# Patient Record
Sex: MALE | Race: BLACK OR AFRICAN AMERICAN | NOT HISPANIC OR LATINO | Employment: OTHER | URBAN - METROPOLITAN AREA
[De-identification: names, ages, dates, MRNs, and addresses within clinical notes are randomized per-mention and may not be internally consistent; named-entity substitution may affect disease eponyms.]

---

## 2017-01-05 ENCOUNTER — HISTORICAL (OUTPATIENT)
Dept: SURGERY | Facility: HOSPITAL | Age: 77
End: 2017-01-05

## 2017-04-25 ENCOUNTER — HISTORICAL (OUTPATIENT)
Dept: ADMINISTRATIVE | Facility: HOSPITAL | Age: 77
End: 2017-04-25

## 2017-07-05 ENCOUNTER — HISTORICAL (OUTPATIENT)
Dept: LAB | Facility: HOSPITAL | Age: 77
End: 2017-07-05

## 2017-07-05 LAB
ABS NEUT (OLG): 5.84 X10(3)/MCL (ref 2.1–9.2)
ALBUMIN SERPL-MCNC: 3.6 GM/DL (ref 3.4–5)
ALBUMIN/GLOB SERPL: 0.9 RATIO (ref 1.1–2)
ALP SERPL-CCNC: 99 UNIT/L (ref 50–136)
ALT SERPL-CCNC: 16 UNIT/L (ref 12–78)
APTT PPP: 36.4 SECOND(S) (ref 20.6–36)
AST SERPL-CCNC: 10 UNIT/L (ref 15–37)
BASOPHILS # BLD AUTO: 0 X10(3)/MCL (ref 0–0.2)
BASOPHILS NFR BLD AUTO: 1 %
BILIRUB SERPL-MCNC: 0.3 MG/DL (ref 0.2–1)
BILIRUBIN DIRECT+TOT PNL SERPL-MCNC: 0.1 MG/DL (ref 0–0.5)
BILIRUBIN DIRECT+TOT PNL SERPL-MCNC: 0.2 MG/DL (ref 0–0.8)
BUN SERPL-MCNC: 16 MG/DL (ref 7–18)
CALCIUM SERPL-MCNC: 8.3 MG/DL (ref 8.5–10.1)
CHLORIDE SERPL-SCNC: 97 MMOL/L (ref 98–107)
CO2 SERPL-SCNC: 32 MMOL/L (ref 21–32)
CREAT SERPL-MCNC: 5.36 MG/DL (ref 0.7–1.3)
EOSINOPHIL # BLD AUTO: 0.3 X10(3)/MCL (ref 0–0.9)
EOSINOPHIL NFR BLD AUTO: 4 %
ERYTHROCYTE [DISTWIDTH] IN BLOOD BY AUTOMATED COUNT: 15.4 % (ref 11.5–17)
GLOBULIN SER-MCNC: 3.8 GM/DL (ref 2.4–3.5)
GLUCOSE SERPL-MCNC: 142 MG/DL (ref 74–106)
HCT VFR BLD AUTO: 33 % (ref 42–52)
HGB BLD-MCNC: 10 GM/DL (ref 14–18)
INR PPP: 1.05 (ref 0–1.27)
LYMPHOCYTES # BLD AUTO: 1 X10(3)/MCL (ref 0.6–4.6)
LYMPHOCYTES NFR BLD AUTO: 13 %
MCH RBC QN AUTO: 27.9 PG (ref 27–31)
MCHC RBC AUTO-ENTMCNC: 30.3 GM/DL (ref 33–36)
MCV RBC AUTO: 92.2 FL (ref 80–94)
MONOCYTES # BLD AUTO: 0.9 X10(3)/MCL (ref 0.1–1.3)
MONOCYTES NFR BLD AUTO: 10 %
MRSA SCREEN BY PCR: NEGATIVE
NEUTROPHILS # BLD AUTO: 5.84 X10(3)/MCL (ref 1.4–7.9)
NEUTROPHILS NFR BLD AUTO: 71 %
PLATELET # BLD AUTO: 234 X10(3)/MCL (ref 130–400)
PMV BLD AUTO: 9.5 FL (ref 9.4–12.4)
POTASSIUM SERPL-SCNC: 3.4 MMOL/L (ref 3.5–5.1)
PROT SERPL-MCNC: 7.4 GM/DL (ref 6.4–8.2)
PROTHROMBIN TIME: 13.5 SECOND(S) (ref 12.1–14.2)
RBC # BLD AUTO: 3.58 X10(6)/MCL (ref 4.7–6.1)
SODIUM SERPL-SCNC: 141 MMOL/L (ref 136–145)
WBC # SPEC AUTO: 8.2 X10(3)/MCL (ref 4.5–11.5)

## 2017-10-13 ENCOUNTER — HISTORICAL (OUTPATIENT)
Dept: ADMINISTRATIVE | Facility: HOSPITAL | Age: 77
End: 2017-10-13

## 2017-10-30 ENCOUNTER — HISTORICAL (OUTPATIENT)
Dept: RADIOLOGY | Facility: HOSPITAL | Age: 77
End: 2017-10-30

## 2017-10-30 LAB
APTT PPP: 41.4 SECOND(S) (ref 24.8–36.9)
INR PPP: 1.68 (ref 0–1.27)
PLATELET # BLD AUTO: 206 X10(3)/MCL (ref 130–400)
PROTHROMBIN TIME: 19.5 SECOND(S) (ref 12.2–14.7)

## 2017-11-15 ENCOUNTER — HISTORICAL (OUTPATIENT)
Dept: ADMINISTRATIVE | Facility: HOSPITAL | Age: 77
End: 2017-11-15

## 2017-11-15 LAB — TSH SERPL-ACNC: 2.1 MIU/ML (ref 0.36–3.74)

## 2018-03-17 ENCOUNTER — HISTORICAL (OUTPATIENT)
Dept: RADIOLOGY | Facility: HOSPITAL | Age: 78
End: 2018-03-17

## 2018-04-24 ENCOUNTER — HISTORICAL (OUTPATIENT)
Dept: RADIOLOGY | Facility: HOSPITAL | Age: 78
End: 2018-04-24

## 2019-04-17 ENCOUNTER — HISTORICAL (OUTPATIENT)
Dept: CARDIOLOGY | Facility: HOSPITAL | Age: 79
End: 2019-04-17

## 2019-05-08 ENCOUNTER — HOSPITAL ENCOUNTER (OUTPATIENT)
Dept: MEDSURG UNIT | Facility: HOSPITAL | Age: 79
End: 2019-05-09
Attending: INTERNAL MEDICINE | Admitting: INTERNAL MEDICINE

## 2019-05-08 LAB
ABS NEUT (OLG): 5.09 X10(3)/MCL (ref 2.1–9.2)
APTT PPP: 31.7 SECOND(S) (ref 24.2–33.9)
BASOPHILS # BLD AUTO: 0 X10(3)/MCL (ref 0–0.2)
BASOPHILS NFR BLD AUTO: 0 %
BUN SERPL-MCNC: 35 MG/DL (ref 7–18)
CALCIUM SERPL-MCNC: 9.1 MG/DL (ref 8.5–10.1)
CHLORIDE SERPL-SCNC: 98 MMOL/L (ref 98–107)
CO2 SERPL-SCNC: 32 MMOL/L (ref 21–32)
CREAT SERPL-MCNC: 8.01 MG/DL (ref 0.7–1.3)
CREAT/UREA NIT SERPL: 4.4
EOSINOPHIL # BLD AUTO: 0.5 X10(3)/MCL (ref 0–0.9)
EOSINOPHIL NFR BLD AUTO: 6 %
ERYTHROCYTE [DISTWIDTH] IN BLOOD BY AUTOMATED COUNT: 16.1 % (ref 11.5–17)
GLUCOSE SERPL-MCNC: 135 MG/DL (ref 74–106)
HCT VFR BLD AUTO: 38.7 % (ref 42–52)
HGB BLD-MCNC: 11.2 GM/DL (ref 14–18)
INR PPP: 1 (ref 0–1.3)
LYMPHOCYTES # BLD AUTO: 1.3 X10(3)/MCL (ref 0.6–4.6)
LYMPHOCYTES NFR BLD AUTO: 17 %
MCH RBC QN AUTO: 29.2 PG (ref 27–31)
MCHC RBC AUTO-ENTMCNC: 28.9 GM/DL (ref 33–36)
MCV RBC AUTO: 101 FL (ref 80–94)
MONOCYTES # BLD AUTO: 0.8 X10(3)/MCL (ref 0.1–1.3)
MONOCYTES NFR BLD AUTO: 11 %
NEUTROPHILS # BLD AUTO: 5.09 X10(3)/MCL (ref 2.1–9.2)
NEUTROPHILS NFR BLD AUTO: 65 %
PLATELET # BLD AUTO: 165 X10(3)/MCL (ref 130–400)
PMV BLD AUTO: 9.8 FL (ref 9.4–12.4)
POTASSIUM SERPL-SCNC: 4 MMOL/L (ref 3.5–5.1)
PROTHROMBIN TIME: 13.2 SECOND(S) (ref 12–14)
RBC # BLD AUTO: 3.83 X10(6)/MCL (ref 4.7–6.1)
SODIUM SERPL-SCNC: 138 MMOL/L (ref 136–145)
WBC # SPEC AUTO: 7.8 X10(3)/MCL (ref 4.5–11.5)

## 2019-05-09 LAB
ABS NEUT (OLG): 8.24 X10(3)/MCL (ref 2.1–9.2)
ALBUMIN SERPL-MCNC: 3.3 GM/DL (ref 3.4–5)
ALBUMIN/GLOB SERPL: 1 {RATIO}
ALP SERPL-CCNC: 117 UNIT/L (ref 50–136)
ALT SERPL-CCNC: 24 UNIT/L (ref 12–78)
AST SERPL-CCNC: 20 UNIT/L (ref 15–37)
BASOPHILS # BLD AUTO: 0 X10(3)/MCL (ref 0–0.2)
BASOPHILS NFR BLD AUTO: 0 %
BILIRUB SERPL-MCNC: 0.5 MG/DL (ref 0.2–1)
BILIRUBIN DIRECT+TOT PNL SERPL-MCNC: 0.1 MG/DL (ref 0–0.2)
BILIRUBIN DIRECT+TOT PNL SERPL-MCNC: 0.4 MG/DL (ref 0–0.8)
BUN SERPL-MCNC: 29 MG/DL (ref 7–18)
CALCIUM SERPL-MCNC: 8.6 MG/DL (ref 8.5–10.1)
CHLORIDE SERPL-SCNC: 95 MMOL/L (ref 98–107)
CO2 SERPL-SCNC: 29 MMOL/L (ref 21–32)
CREAT SERPL-MCNC: 6.67 MG/DL (ref 0.7–1.3)
EOSINOPHIL # BLD AUTO: 0.3 X10(3)/MCL (ref 0–0.9)
EOSINOPHIL NFR BLD AUTO: 3 %
ERYTHROCYTE [DISTWIDTH] IN BLOOD BY AUTOMATED COUNT: 16.6 % (ref 11.5–17)
GLOBULIN SER-MCNC: 3.2 GM/DL (ref 2.4–3.5)
GLUCOSE SERPL-MCNC: 127 MG/DL (ref 74–106)
HCT VFR BLD AUTO: 34.7 % (ref 42–52)
HGB BLD-MCNC: 10.2 GM/DL (ref 14–18)
LYMPHOCYTES # BLD AUTO: 1 X10(3)/MCL (ref 0.6–4.6)
LYMPHOCYTES NFR BLD AUTO: 10 %
MCH RBC QN AUTO: 29.2 PG (ref 27–31)
MCHC RBC AUTO-ENTMCNC: 29.4 GM/DL (ref 33–36)
MCV RBC AUTO: 99.4 FL (ref 80–94)
MONOCYTES # BLD AUTO: 1.2 X10(3)/MCL (ref 0.1–1.3)
MONOCYTES NFR BLD AUTO: 11 %
NEUTROPHILS # BLD AUTO: 8.24 X10(3)/MCL (ref 2.1–9.2)
NEUTROPHILS NFR BLD AUTO: 76 %
PLATELET # BLD AUTO: 163 X10(3)/MCL (ref 130–400)
PMV BLD AUTO: 10.4 FL (ref 9.4–12.4)
POTASSIUM SERPL-SCNC: 4.2 MMOL/L (ref 3.5–5.1)
PROT SERPL-MCNC: 6.5 GM/DL (ref 6.4–8.2)
RBC # BLD AUTO: 3.49 X10(6)/MCL (ref 4.7–6.1)
SODIUM SERPL-SCNC: 134 MMOL/L (ref 136–145)
WBC # SPEC AUTO: 10.9 X10(3)/MCL (ref 4.5–11.5)

## 2021-02-17 ENCOUNTER — HISTORICAL (OUTPATIENT)
Dept: ADMINISTRATIVE | Facility: HOSPITAL | Age: 81
End: 2021-02-17

## 2021-02-17 LAB — GLUCOSE SERPL-MCNC: 75 MG/DL (ref 82–115)

## 2021-04-28 ENCOUNTER — HISTORICAL (OUTPATIENT)
Dept: ADMINISTRATIVE | Facility: HOSPITAL | Age: 81
End: 2021-04-28

## 2021-04-28 LAB — GLUCOSE SERPL-MCNC: 56 MG/DL (ref 82–115)

## 2021-05-26 ENCOUNTER — HISTORICAL (OUTPATIENT)
Dept: ADMINISTRATIVE | Facility: HOSPITAL | Age: 81
End: 2021-05-26

## 2021-05-26 LAB — GLUCOSE SERPL-MCNC: 133 MG/DL (ref 82–115)

## 2021-07-29 ENCOUNTER — HISTORICAL (OUTPATIENT)
Dept: ADMINISTRATIVE | Facility: HOSPITAL | Age: 81
End: 2021-07-29

## 2021-07-29 LAB — GLUCOSE SERPL-MCNC: 104 MG/DL (ref 82–115)

## 2021-08-20 ENCOUNTER — HISTORICAL (OUTPATIENT)
Dept: ADMINISTRATIVE | Facility: HOSPITAL | Age: 81
End: 2021-08-20

## 2021-08-20 LAB
ABS NEUT (OLG): 5.53 X10(3)/MCL (ref 2.1–9.2)
BASOPHILS # BLD AUTO: 0.06 X10(3)/MCL (ref 0–0.2)
BASOPHILS NFR BLD AUTO: 0.7 % (ref 0–0.9)
EOSINOPHIL # BLD AUTO: 0.68 X10(3)/MCL (ref 0–0.9)
EOSINOPHIL NFR BLD AUTO: 7.9 % (ref 0–6.5)
ERYTHROCYTE [DISTWIDTH] IN BLOOD BY AUTOMATED COUNT: 14.9 % (ref 11.5–17)
HCT VFR BLD AUTO: 33.5 % (ref 42–52)
HGB BLD-MCNC: 10.1 GM/DL (ref 14–18)
IMM GRANULOCYTES # BLD AUTO: 0.06 10*3/UL (ref 0–0.02)
IMM GRANULOCYTES NFR BLD AUTO: 0.7 % (ref 0–0.43)
LYMPHOCYTES # BLD AUTO: 1.12 X10(3)/MCL (ref 0.6–4.6)
LYMPHOCYTES NFR BLD AUTO: 13 % (ref 16.2–38.3)
MCH RBC QN AUTO: 28.3 PG (ref 27–31)
MCHC RBC AUTO-ENTMCNC: 30.1 GM/DL (ref 33–36)
MCV RBC AUTO: 93.8 FL (ref 80–94)
MONOCYTES # BLD AUTO: 1.19 X10(3)/MCL (ref 0.1–1.3)
MONOCYTES NFR BLD AUTO: 13.8 % (ref 4.7–11.3)
NEUTROPHILS # BLD AUTO: 5.53 X10(3)/MCL (ref 2.1–9.2)
NEUTROPHILS NFR BLD AUTO: 63.9 % (ref 49.1–73.4)
NRBC BLD AUTO-RTO: 0 % (ref 0–0.2)
PLATELET # BLD AUTO: 220 X10(3)/MCL (ref 130–400)
PMV BLD AUTO: 10.2 FL (ref 7.4–10.4)
RBC # BLD AUTO: 3.57 X10(6)/MCL (ref 4.7–6.1)
URATE SERPL-MCNC: 3.5 MG/DL (ref 3.5–7.2)
WBC # SPEC AUTO: 8.6 X10(3)/MCL (ref 4.5–11.5)

## 2021-08-31 ENCOUNTER — HISTORICAL (OUTPATIENT)
Dept: ADMINISTRATIVE | Facility: HOSPITAL | Age: 81
End: 2021-08-31

## 2021-08-31 LAB — GLUCOSE SERPL-MCNC: 86 MG/DL (ref 82–115)

## 2021-09-27 ENCOUNTER — HISTORICAL (OUTPATIENT)
Dept: ADMINISTRATIVE | Facility: HOSPITAL | Age: 81
End: 2021-09-27

## 2021-09-27 LAB
ABS NEUT (OLG): 4.17 X10(3)/MCL (ref 2.1–9.2)
ALBUMIN SERPL-MCNC: 2.6 GM/DL (ref 3.4–4.8)
ALBUMIN/GLOB SERPL: 0.9 RATIO (ref 1.1–2)
ALP SERPL-CCNC: 86 UNIT/L (ref 40–150)
ALT SERPL-CCNC: 9 UNIT/L (ref 0–55)
AST SERPL-CCNC: 15 UNIT/L (ref 5–34)
BASOPHILS # BLD AUTO: 0.06 X10(3)/MCL (ref 0–0.2)
BASOPHILS NFR BLD AUTO: 0.9 % (ref 0–0.9)
BILIRUB SERPL-MCNC: 0.5 MG/DL (ref 0.2–1.2)
BILIRUBIN DIRECT+TOT PNL SERPL-MCNC: 0.2 MG/DL (ref 0–0.5)
BILIRUBIN DIRECT+TOT PNL SERPL-MCNC: 0.3 MG/DL (ref 0–0.8)
BUN SERPL-MCNC: 28.4 MG/DL (ref 8.4–25.7)
CALCIUM SERPL-MCNC: 7.8 MG/DL (ref 8.8–10)
CHLORIDE SERPL-SCNC: 95 MMOL/L (ref 98–107)
CHOLEST SERPL-MCNC: 122 MG/DL
CHOLEST/HDLC SERPL: 3 {RATIO} (ref 0–5)
CO2 SERPL-SCNC: 30 MMOL/L (ref 23–31)
CREAT SERPL-MCNC: 6.57 MG/DL (ref 0.72–1.25)
EOSINOPHIL # BLD AUTO: 0.56 X10(3)/MCL (ref 0–0.9)
EOSINOPHIL NFR BLD AUTO: 8.6 % (ref 0–6.5)
ERYTHROCYTE [DISTWIDTH] IN BLOOD BY AUTOMATED COUNT: 15.7 % (ref 11.5–17)
EST. AVERAGE GLUCOSE BLD GHB EST-MCNC: 111.2 MG/DL
GLOBULIN SER-MCNC: 3 GM/DL (ref 2.4–3.5)
GLUCOSE SERPL-MCNC: 68 MG/DL (ref 82–115)
HBA1C MFR BLD: 5.5 %
HCT VFR BLD AUTO: 26.4 % (ref 42–52)
HDLC SERPL-MCNC: 44 MG/DL (ref 40–60)
HGB BLD-MCNC: 8.2 GM/DL (ref 14–18)
IMM GRANULOCYTES # BLD AUTO: 0.06 10*3/UL (ref 0–0.02)
IMM GRANULOCYTES NFR BLD AUTO: 0.9 % (ref 0–0.43)
LDLC SERPL CALC-MCNC: 69 MG/DL (ref 50–140)
LYMPHOCYTES # BLD AUTO: 0.88 X10(3)/MCL (ref 0.6–4.6)
LYMPHOCYTES NFR BLD AUTO: 13.5 % (ref 16.2–38.3)
MCH RBC QN AUTO: 28.1 PG (ref 27–31)
MCHC RBC AUTO-ENTMCNC: 31.1 GM/DL (ref 33–36)
MCV RBC AUTO: 90.4 FL (ref 80–94)
MONOCYTES # BLD AUTO: 0.81 X10(3)/MCL (ref 0.1–1.3)
MONOCYTES NFR BLD AUTO: 12.4 % (ref 4.7–11.3)
NEUTROPHILS # BLD AUTO: 4.17 X10(3)/MCL (ref 2.1–9.2)
NEUTROPHILS NFR BLD AUTO: 63.7 % (ref 49.1–73.4)
NRBC BLD AUTO-RTO: 0 % (ref 0–0.2)
PLATELET # BLD AUTO: 231 X10(3)/MCL (ref 130–400)
PMV BLD AUTO: 10.4 FL (ref 7.4–10.4)
POTASSIUM SERPL-SCNC: 3.3 MMOL/L (ref 3.5–5.1)
PREALB SERPL-MCNC: 22 MG/DL (ref 16–42)
PROT SERPL-MCNC: 5.6 GM/DL (ref 5.8–7.6)
RBC # BLD AUTO: 2.92 X10(6)/MCL (ref 4.7–6.1)
SODIUM SERPL-SCNC: 139 MMOL/L (ref 136–145)
TRIGL SERPL-MCNC: 43 MG/DL (ref 0–150)
TSH SERPL-ACNC: 2.11 UIU/ML (ref 0.35–4.94)
VLDLC SERPL CALC-MCNC: 9 MG/DL
WBC # SPEC AUTO: 6.5 X10(3)/MCL (ref 4.5–11.5)

## 2021-10-26 ENCOUNTER — HISTORICAL (OUTPATIENT)
Dept: ADMINISTRATIVE | Facility: HOSPITAL | Age: 81
End: 2021-10-26

## 2021-10-26 LAB — GLUCOSE SERPL-MCNC: 116 MG/DL (ref 82–115)

## 2021-11-29 ENCOUNTER — HISTORICAL (OUTPATIENT)
Dept: ADMINISTRATIVE | Facility: HOSPITAL | Age: 81
End: 2021-11-29

## 2021-11-29 LAB — GLUCOSE SERPL-MCNC: 126 MG/DL (ref 82–115)

## 2022-01-27 ENCOUNTER — HISTORICAL (OUTPATIENT)
Dept: ADMINISTRATIVE | Facility: HOSPITAL | Age: 82
End: 2022-01-27

## 2022-01-27 LAB — GLUCOSE SERPL-MCNC: 126 MG/DL (ref 82–115)

## 2022-02-22 ENCOUNTER — HISTORICAL (OUTPATIENT)
Dept: ADMINISTRATIVE | Facility: HOSPITAL | Age: 82
End: 2022-02-22

## 2022-02-22 LAB — GLUCOSE SERPL-MCNC: 80 MG/DL (ref 82–115)

## 2022-03-29 ENCOUNTER — HISTORICAL (OUTPATIENT)
Dept: ADMINISTRATIVE | Facility: HOSPITAL | Age: 82
End: 2022-03-29

## 2022-03-29 LAB
EST. AVERAGE GLUCOSE BLD GHB EST-MCNC: 128.4 MG/DL
GLUCOSE SERPL-MCNC: 112 MG/DL (ref 82–115)
HBA1C MFR BLD: 6.1 %
TSH SERPL-ACNC: 2.96 M[IU]/L (ref 0.35–4.94)

## 2022-04-10 ENCOUNTER — HISTORICAL (OUTPATIENT)
Dept: ADMINISTRATIVE | Facility: HOSPITAL | Age: 82
End: 2022-04-10
Payer: MEDICARE

## 2022-04-28 VITALS
SYSTOLIC BLOOD PRESSURE: 104 MMHG | BODY MASS INDEX: 24.18 KG/M2 | OXYGEN SATURATION: 98 % | WEIGHT: 178.56 LBS | DIASTOLIC BLOOD PRESSURE: 62 MMHG | HEIGHT: 72 IN

## 2022-04-29 ENCOUNTER — HISTORICAL (OUTPATIENT)
Dept: ADMINISTRATIVE | Facility: HOSPITAL | Age: 82
End: 2022-04-29
Payer: MEDICARE

## 2022-04-29 LAB — GLUCOSE SERPL-MCNC: 79 MG/DL (ref 82–115)

## 2022-04-30 NOTE — CONSULTS
DATE OF CONSULTATION:      ATTENDING PHYSICIAN:  Dominguez Gibson MD  CONSULTING PHYSICIAN:  Cricket Lyon MD    HISTORY OF PRESENT ILLNESS:  We are called in consultation to see this 78-year-old black male who has end-stage renal disease to provide renal care as well as hemodialysis.  The patient has been admitted by Dr. Gibson for peripheral angiography and angioplasty.  He had undergone left iliac and left femoral PTAs  without problems yesterday.  The patient has a history of end-stage renal disease, undergoing chronic hemodialysis in [place] on a Monday, Wednesday, Friday schedule.  He also has a history of arterial hypertension, as well as type 2 diabetes mellitus.  Known to have coronary artery disease.    ALLERGIES:  He has no known allergies.    LABORATORY ANALYSIS:  Done yesterday, showed sodium, potassium chloride, and CO2 all within normal limits.  Blood sugar 135, BUN 35, creatinine 8.01.  H and H 11.2 and 38.7%, with normal white blood cells and platelet count.    PHYSICAL EXAMINATION:  Now the patient is fully alert, oriented.  He is not in acute distress.  His blood pressure is 133/62, with a heart rate of 70, regular.  Respiration 18.  Temperature 37.5 degrees Celsius.   HEENT:  Atraumatic.  Pupils equally reactive to light.  No scleral discoloration.  No discharges.   NECK:  Supple.  He has a lipoma on the right lateral posterior aspect of the neck.  It nontender.  Soft.  There are no other masses.   CHEST:  Clear.   HEART:  Regular rhythm.   ABDOMEN:  Soft and nontender.  No masses palpated.   EXTREMITIES:  There is no peripheral edema.    ASSESSMENT:    1. End-stage renal disease, on chronic hemodialysis.  2. Peripheral artery disease with status post left iliac and femoral percutaneous transluminal angioplasties.   3. Type 2 diabetes mellitus.  4. Arterial hypertension.  5. Coronary artery disease.  6. Neck lipoma.    PLAN:    1. Continue dialysis on Monday, Wednesday, Friday  schedule while he is in hospital.  2.   Continue monitoring patient.   Thank you very much for allowing us to participate in the care of this gentleman.        ______________________________  MD NOE Santo/LUCIA  DD:  05/09/2019  Time:  07:26AM  DT:  05/09/2019  Time:  07:45AM  Job #:  [id number]    cc: [facility]   Dominguez Gibson MD

## 2022-04-30 NOTE — CONSULTS
DATE OF CONSULTATION:  04/17/2019    ATTENDING PHYSICIAN:  Christopher Morales DO  CONSULTING PHYSICIAN:  Alexei Mueller MD    REASON FOR CONSULTATION:  Patient is on dialysis, and patient has ESRD.    HISTORY:  This is a 78-year-old black male who is a chronic hemodialysis patient at [facility], being followed by Dr. Neli Bernal, has been admitted for some peripheral arterial disease, for which he had some peripheral arteriogram done, and patient is in need for dialysis.  At present, he denies any chest pain, abdominal pain, nausea, vomiting, diarrhea, constipation, hematemesis, hemoptysis, melena, dysuria, hematuria, hesitancy, frequency, urgency.    PAST MEDICAL AND SURGICAL HISTORY:  Positive for ESRD, coronary artery disease, history of coronary artery bypass graft done in the past, hypothyroidism, history of peripheral arterial disease, history of dyslipidemia, type 2 diabetes mellitus, history of AV shunt placement, and peripheral arterial angioplasty done in the past.  He has some neck mass on the right side which is benign and has been sitting there forever.  He also gives history of bilateral cataract surgery done.    FAMILY HISTORY:  Positive for high blood pressure, coronary artery disease, and type 2 diabetes mellitus to many family members.    REVIEW OF SYSTEMS:  All 12-point review of systems is negative except for in History of Present Illness.    SOCIAL HISTORY:  Negative for smoking, alcoholism, or drug abuse.    PHYSICAL EXAMINATION:  GENERAL:  He is awake, alert, oriented to time, person, and place.     NECK:  Supple.  No JVD.  No lymphadenopathy.  No thyromegaly.     LUNGS:  Clear.     HEART:  Without rub or gallop.     ABDOMEN:  Soft.  Bowel sounds positive.     EXTREMITIES:  No cyanosis noted.  No edema noted.     NEUROLOGIC:  No focal motor deficit noted.    LABS:  From outside labs done on 04/16/2019, BUN was 17, creatinine was 5.1 from yesterday.    IMPRESSION:     1. End-stage renal disease.    2. Hypertension.    3. Diabetes mellitus, type 2.    4. Anemia of chronic kidney disease.    5. Coronary artery disease, coronary artery bypass graft.    6. Peripheral arterial disease.    RECOMMENDATIONS:  From the renal standpoint of view, patient is on dialysis at present time, and we will continue to provide hemodialysis Monday, Wednesday, Friday if needed and, if discharged, he can be followed at the dialysis unit.       Thank you for this consultation.        ______________________________  MD LEFTY Fisher/LESA  DD:  04/17/2019  Time:  01:04PM  DT:  04/17/2019  Time:  01:17PM  Job #:  [id number]

## 2022-05-23 ENCOUNTER — HOSPITAL ENCOUNTER (EMERGENCY)
Facility: HOSPITAL | Age: 82
Discharge: HOME | End: 2022-05-23
Attending: EMERGENCY MEDICINE
Payer: MEDICARE

## 2022-05-23 VITALS
SYSTOLIC BLOOD PRESSURE: 154 MMHG | DIASTOLIC BLOOD PRESSURE: 74 MMHG | HEIGHT: 72 IN | RESPIRATION RATE: 20 BRPM | WEIGHT: 180 LBS | OXYGEN SATURATION: 95 % | BODY MASS INDEX: 24.38 KG/M2 | TEMPERATURE: 98 F | HEART RATE: 78 BPM

## 2022-05-23 DIAGNOSIS — M25.559 HIP PAIN: Primary | ICD-10-CM

## 2022-05-23 DIAGNOSIS — S72.112A CLOSED AVULSION FRACTURE OF GREATER TROCHANTER OF LEFT FEMUR, INITIAL ENCOUNTER: ICD-10-CM

## 2022-05-23 DIAGNOSIS — M79.642 LEFT HAND PAIN: ICD-10-CM

## 2022-05-23 DIAGNOSIS — I10 BENIGN ESSENTIAL HTN: ICD-10-CM

## 2022-05-23 DIAGNOSIS — S72.92XA FEMUR FRACTURE, LEFT: ICD-10-CM

## 2022-05-23 DIAGNOSIS — N18.6 ESRD (END STAGE RENAL DISEASE) ON DIALYSIS: ICD-10-CM

## 2022-05-23 DIAGNOSIS — Z99.2 ESRD (END STAGE RENAL DISEASE) ON DIALYSIS: ICD-10-CM

## 2022-05-23 DIAGNOSIS — T14.90XA TRAUMA: ICD-10-CM

## 2022-05-23 DIAGNOSIS — J96.11 CHRONIC RESPIRATORY FAILURE WITH HYPOXIA: ICD-10-CM

## 2022-05-23 DIAGNOSIS — R51.9 LEFT FACIAL PAIN: ICD-10-CM

## 2022-05-23 LAB
ALBUMIN SERPL-MCNC: 3.5 GM/DL (ref 3.4–4.8)
ALBUMIN/GLOB SERPL: 0.9 RATIO (ref 1.1–2)
ALP SERPL-CCNC: 96 UNIT/L (ref 40–150)
ALT SERPL-CCNC: 23 UNIT/L (ref 0–55)
AST SERPL-CCNC: 46 UNIT/L (ref 5–34)
BASOPHILS # BLD AUTO: 0.05 X10(3)/MCL (ref 0–0.2)
BASOPHILS NFR BLD AUTO: 0.6 %
BILIRUBIN DIRECT+TOT PNL SERPL-MCNC: 1.1 MG/DL
BUN SERPL-MCNC: 20.6 MG/DL (ref 8.4–25.7)
CALCIUM SERPL-MCNC: 9.1 MG/DL (ref 8.8–10)
CHLORIDE SERPL-SCNC: 98 MMOL/L (ref 98–107)
CO2 SERPL-SCNC: 30 MMOL/L (ref 23–31)
CREAT SERPL-MCNC: 3.85 MG/DL (ref 0.73–1.18)
EOSINOPHIL # BLD AUTO: 0.09 X10(3)/MCL (ref 0–0.9)
EOSINOPHIL NFR BLD AUTO: 1.1 %
ERYTHROCYTE [DISTWIDTH] IN BLOOD BY AUTOMATED COUNT: 18.6 % (ref 11.5–17)
GLOBULIN SER-MCNC: 3.8 GM/DL (ref 2.4–3.5)
GLUCOSE SERPL-MCNC: 126 MG/DL (ref 82–115)
HCT VFR BLD AUTO: 41.6 % (ref 42–52)
HGB BLD-MCNC: 12.9 GM/DL (ref 14–18)
IMM GRANULOCYTES # BLD AUTO: 0.06 X10(3)/MCL (ref 0–0.02)
IMM GRANULOCYTES NFR BLD AUTO: 0.7 % (ref 0–0.43)
INR BLD: 1.14 (ref 0–1.3)
LYMPHOCYTES # BLD AUTO: 0.68 X10(3)/MCL (ref 0.6–4.6)
LYMPHOCYTES NFR BLD AUTO: 8.2 %
MCH RBC QN AUTO: 30.6 PG (ref 27–31)
MCHC RBC AUTO-ENTMCNC: 31 MG/DL (ref 33–36)
MCV RBC AUTO: 98.6 FL (ref 80–94)
MONOCYTES # BLD AUTO: 0.54 X10(3)/MCL (ref 0.1–1.3)
MONOCYTES NFR BLD AUTO: 6.5 %
NEUTROPHILS # BLD AUTO: 6.9 X10(3)/MCL (ref 2.1–9.2)
NEUTROPHILS NFR BLD AUTO: 82.9 %
NRBC BLD AUTO-RTO: 0 %
PLATELET # BLD AUTO: 177 X10(3)/MCL (ref 130–400)
PMV BLD AUTO: 10.6 FL (ref 9.4–12.4)
POTASSIUM SERPL-SCNC: 4.4 MMOL/L (ref 3.5–5.1)
PROT SERPL-MCNC: 7.3 GM/DL (ref 5.8–7.6)
PROTHROMBIN TIME: 14.3 SECONDS (ref 12.5–14.5)
RBC # BLD AUTO: 4.22 X10(6)/MCL (ref 4.7–6.1)
SODIUM SERPL-SCNC: 140 MMOL/L (ref 136–145)
WBC # SPEC AUTO: 8.3 X10(3)/MCL (ref 4.5–11.5)

## 2022-05-23 PROCEDURE — G0390 TRAUMA RESPONS W/HOSP CRITI: HCPCS

## 2022-05-23 PROCEDURE — 99284 EMERGENCY DEPT VISIT MOD MDM: CPT | Mod: 25

## 2022-05-23 PROCEDURE — 85025 COMPLETE CBC W/AUTO DIFF WBC: CPT | Performed by: EMERGENCY MEDICINE

## 2022-05-23 PROCEDURE — 36415 COLL VENOUS BLD VENIPUNCTURE: CPT | Performed by: EMERGENCY MEDICINE

## 2022-05-23 PROCEDURE — 25000003 PHARM REV CODE 250: Performed by: EMERGENCY MEDICINE

## 2022-05-23 PROCEDURE — 85610 PROTHROMBIN TIME: CPT | Performed by: EMERGENCY MEDICINE

## 2022-05-23 PROCEDURE — 80053 COMPREHEN METABOLIC PANEL: CPT | Performed by: EMERGENCY MEDICINE

## 2022-05-23 RX ORDER — POLYETHYLENE GLYCOL 3350 17 G/17G
17 POWDER, FOR SOLUTION ORAL DAILY
COMMUNITY

## 2022-05-23 RX ORDER — ASPIRIN 81 MG/1
81 TABLET ORAL DAILY
COMMUNITY

## 2022-05-23 RX ORDER — ATORVASTATIN CALCIUM 20 MG/1
20 TABLET, FILM COATED ORAL NIGHTLY
COMMUNITY

## 2022-05-23 RX ORDER — CALCIUM CARBONATE 200(500)MG
2 TABLET,CHEWABLE ORAL 3 TIMES DAILY PRN
COMMUNITY

## 2022-05-23 RX ORDER — AMIODARONE HYDROCHLORIDE 200 MG/1
200 TABLET ORAL DAILY
COMMUNITY

## 2022-05-23 RX ORDER — LORATADINE 10 MG/1
10 TABLET ORAL DAILY
COMMUNITY

## 2022-05-23 RX ORDER — CHOLECALCIFEROL (VITAMIN D3) 25 MCG
2000 TABLET ORAL DAILY
COMMUNITY

## 2022-05-23 RX ORDER — LEVOTHYROXINE SODIUM 75 UG/1
75 TABLET ORAL
COMMUNITY

## 2022-05-23 RX ORDER — AMLODIPINE BESYLATE 10 MG/1
10 TABLET ORAL DAILY
COMMUNITY

## 2022-05-23 RX ORDER — TAMSULOSIN HYDROCHLORIDE 0.4 MG/1
0.4 CAPSULE ORAL DAILY
COMMUNITY

## 2022-05-23 RX ORDER — CLOPIDOGREL BISULFATE 75 MG/1
75 TABLET ORAL DAILY
COMMUNITY

## 2022-05-23 RX ORDER — MELATONIN 3 MG
3 CAPSULE ORAL NIGHTLY
COMMUNITY

## 2022-05-23 RX ORDER — LIDOCAINE AND PRILOCAINE 25; 25 MG/G; MG/G
1 CREAM TOPICAL
COMMUNITY

## 2022-05-23 RX ORDER — SODIUM BICARBONATE 650 MG/1
650 TABLET ORAL 2 TIMES DAILY
COMMUNITY

## 2022-05-23 RX ORDER — CITALOPRAM 10 MG/1
10 TABLET ORAL DAILY
COMMUNITY

## 2022-05-23 RX ORDER — HYDROCODONE BITARTRATE AND ACETAMINOPHEN 5; 325 MG/1; MG/1
1 TABLET ORAL
Status: COMPLETED | OUTPATIENT
Start: 2022-05-23 | End: 2022-05-23

## 2022-05-23 RX ORDER — B-COMPLEX WITH VITAMIN C
1 TABLET ORAL DAILY
COMMUNITY

## 2022-05-23 RX ORDER — OXYCODONE AND ACETAMINOPHEN 7.5; 325 MG/1; MG/1
1 TABLET ORAL EVERY 6 HOURS PRN
COMMUNITY

## 2022-05-23 RX ORDER — PANTOPRAZOLE SODIUM 40 MG/1
40 TABLET, DELAYED RELEASE ORAL 2 TIMES DAILY
COMMUNITY

## 2022-05-23 RX ORDER — CALCIUM ACETATE 667 MG/1
667 CAPSULE ORAL
COMMUNITY

## 2022-05-23 RX ORDER — DOCUSATE SODIUM 50 MG/5ML
50 LIQUID ORAL DAILY
COMMUNITY

## 2022-05-23 RX ORDER — DONEPEZIL HYDROCHLORIDE 10 MG/1
10 TABLET, FILM COATED ORAL DAILY
COMMUNITY

## 2022-05-23 RX ADMIN — HYDROCODONE BITARTRATE AND ACETAMINOPHEN 1 TABLET: 5; 325 TABLET ORAL at 03:05

## 2022-05-23 NOTE — ED PROVIDER NOTES
Encounter Date: 5/23/2022    SCRIBE #1 NOTE: I, Osiris Stevenson, am scribing for, and in the presence of,  Bren Arreaga MD. I have scribed the following portions of the note - Other sections scribed: HPI, ROS, PE.       History   No chief complaint on file.    82 y/o male with hx of ESRD on dialysis MWF and on Plavix presents to the ED via EMS as a Level 2 trauma following a fall this morning. The pt came to the ED from dialysis after having a full run. Per EMS dialysis called because the pt reported he fell out of a lift this morning at the nursing home and hit his head. Denies LOC. The pt complains of left hand pain and left face pain. EMS reports the pt is on 3L O2 all of the time.    The history is provided by the patient and the EMS personnel. No  was used.   Fall  Illness onset: This morninggg. Fall occurred: out of a lift. He fell from an unknown height. He landed on a hard floor. There was no blood loss. The point of impact was the head. The pain is present in the face and left hand. Pertinent negatives include no back pain, no fever, no numbness, no abdominal pain, no nausea, no vomiting, no hematuria, no headaches and no loss of consciousness.     Review of patient's allergies indicates:  Not on File  No past medical history on file.  No past surgical history on file.  No family history on file.     Review of Systems   Constitutional: Negative for chills, diaphoresis and fever.   HENT: Negative for congestion, ear pain, sinus pain and sore throat.    Eyes: Negative for pain, discharge and visual disturbance.   Respiratory: Negative for cough, shortness of breath, wheezing and stridor.    Cardiovascular: Negative for chest pain and palpitations.   Gastrointestinal: Negative for abdominal pain, constipation, diarrhea, nausea, rectal pain and vomiting.   Genitourinary: Negative for dysuria and hematuria.   Musculoskeletal: Negative for back pain and myalgias.        Left hand pain  Left  face pain   Skin: Negative for rash.   Neurological: Negative for dizziness, loss of consciousness, syncope, numbness and headaches.   Hematological: Negative.    Psychiatric/Behavioral: Negative.    All other systems reviewed and are negative.      Physical Exam     Initial Vitals [05/23/22 1226]   BP Pulse Resp Temp SpO2   138/79 78 18 97.9 °F (36.6 °C) (!) 88 %      MAP       --         Physical Exam    Nursing note and vitals reviewed.  Constitutional: He appears well-developed and well-nourished. He is not diaphoretic. No distress.   HENT:   Head: Normocephalic and atraumatic.   Nose: Nose normal.   Mouth/Throat: Oropharynx is clear and moist.   Eyes: Conjunctivae and EOM are normal. Pupils are equal, round, and reactive to light.   Pupils 2 mm and reactive bilaterally    Neck: Trachea normal. Neck supple.   Chronic appearing large, mobile cyst-like structure to right neck   Normal range of motion.  Cardiovascular: Normal rate, regular rhythm, normal heart sounds and intact distal pulses. Exam reveals no gallop and no friction rub.    No murmur heard.  LUE fistula with palpable thrill   Pulmonary/Chest: Breath sounds normal. No respiratory distress. He has no wheezes. He has no rhonchi. He has no rales. He exhibits no tenderness.   Chronic appearing chest deformity   Abdominal: Abdomen is soft. Bowel sounds are normal. He exhibits no distension and no mass. There is no abdominal tenderness. There is no rebound.   Musculoskeletal:         General: No edema.      Cervical back: Normal range of motion and neck supple.      Lumbar back: Normal. No tenderness. Normal range of motion.      Comments: Tenderness to left cheek, left buttock, and entire left hand including finger tips.  Contracture to bilateral lower extremities and RUE     Neurological: He is alert and oriented to person, place, and time. He has normal strength. No cranial nerve deficit or sensory deficit. GCS score is 15. GCS eye subscore is 4. GCS  verbal subscore is 5. GCS motor subscore is 6.   Skin: Skin is warm and dry. Capillary refill takes less than 2 seconds. No rash and no abscess noted. No erythema. No pallor.   Psychiatric: He has a normal mood and affect. His behavior is normal. Judgment and thought content normal.         ED Course   Procedures  Labs Reviewed   COMPREHENSIVE METABOLIC PANEL - Abnormal; Notable for the following components:       Result Value    Glucose Level 126 (*)     Creatinine 3.85 (*)     Globulin 3.8 (*)     Albumin/Globulin Ratio 0.9 (*)     Aspartate Aminotransferase 46 (*)     All other components within normal limits   CBC WITH DIFFERENTIAL - Abnormal; Notable for the following components:    RBC 4.22 (*)     Hgb 12.9 (*)     Hct 41.6 (*)     MCV 98.6 (*)     MCHC 31.0 (*)     RDW 18.6 (*)     IG# 0.06 (*)     IG% 0.7 (*)     All other components within normal limits   PROTIME-INR - Normal   CBC W/ AUTO DIFFERENTIAL    Narrative:     The following orders were created for panel order CBC auto differential.  Procedure                               Abnormality         Status                     ---------                               -----------         ------                     CBC with Differential[[id number]]        Abnormal            Final result                 Please view results for these tests on the individual orders.          Imaging Results          X-Ray Femur Ap/Lat Left (Final result)  Result time 05/23/22 15:10:35    Final result by Chaparro Uribe MD (05/23/22 15:10:35)                 Impression:      No displaced fracture identified      Electronically signed by: Chaparro Uribe MD  Date:    05/23/2022  Time:    15:10             Narrative:    EXAMINATION:  Four images left femur    CLINICAL HISTORY:  Possible fracture    COMPARISON:  None    FINDINGS:  Left JOSE E hardware appears intact without evidence of loosening.  No acute, displaced fracture is identified.  No dislocation.  Vascular calcifications and  vascular stents are present.                               CT Pelvis Without Contrast (Final result)  Result time 05/23/22 14:32:35    Final result by Ivanna Higuera MD (05/23/22 14:32:35)                 Impression:      Left femoral greater trochanter fracture.      Electronically signed by: Ivanna Higuera  Date:    05/23/2022  Time:    14:32             Narrative:    EXAMINATION:  CT PELVIS WITHOUT CONTRAST    CLINICAL HISTORY:  Pelvis pain, stress fracture suspected, neg xray;    TECHNIQUE:  Contiguous axial CT images were obtained through the pelvis without the administration of intravenous contrast.    Coronal and sagittal reformations obtained from the axial data set.    Automatic exposure control was utilized to limit radiation dose.    Radiation Dose:    Total DLP: 286 mGy*cm    COMPARISON:  Pelvic radiograph earlier the same day    FINDINGS:  Acute, minimally comminuted, mildly displaced fracture at the left femoral greater trochanter.    Otherwise no acute displaced fractures or dislocations.    Patient is status post left total hip arthroplasty; streak artifact from which limits locoregional detail.    No bony destructive lesions.    Diffuse demineralization.    Extensive atherosclerotic vascular calcifications.    Left femoral artery stent graft in place, partially visualized.    No acute intrapelvic pathology.                               CT Maxillofacial Without Contrast (Final result)  Result time 05/23/22 14:04:12    Final result by Marion Lerma MD (05/23/22 14:04:12)                 Impression:      No acute fracture identified.      Electronically signed by: Marion Lerma  Date:    05/23/2022  Time:    14:04             Narrative:    EXAMINATION:  CT MAXILLOFACIAL WITHOUT CONTRAST    CLINICAL HISTORY:  Maxillofacial pain;    TECHNIQUE:  Volumetric CT acquisition of the facial bones without contrast. Axial, coronal and sagittal reconstructions.    Automatic exposure control was utilized  to limit radiation dose.    DLP: 1705 mGy-cm    COMPARISON:  None    FINDINGS:  Evaluation is limited by motion artifact.  There is no acute fracture identified.  There is trace scattered paranasal sinus mucosal thickening.  The mastoid air cells are clear.    There is mild soft tissue swelling in the frontal scalp.  The orbits are unremarkable.                               CT Head Without Contrast (Final result)  Result time 05/23/22 13:52:21    Final result by Marion Lerma MD (05/23/22 13:52:21)                 Impression:      1. No acute intracranial abnormality.  2. Chronic microvascular ischemic changes.      Electronically signed by: Marion Lerma  Date:    05/23/2022  Time:    13:52             Narrative:    EXAMINATION:  CT HEAD WITHOUT CONTRAST    CLINICAL HISTORY:  Head trauma, minor (Age >= 65y);    TECHNIQUE:  Axial scans were obtained from skull base to the vertex.    Coronal and sagittal reconstructions obtained from the axial data.    Automatic exposure control was utilized to limit radiation dose.    Contrast: None    Radiation Dose:    Total DLP: 1705 mGy*cm    COMPARISON:  None    FINDINGS:  There is no acute intracranial hemorrhage or edema. The gray-white matter differentiation is preserved.  There is scattered hypodensities in the subcortical and periventricular white matter and left cerebellum, likely representing chronic microvascular ischemic changes.    There is no mass effect or midline shift.  There is diffuse parenchymal volume loss.  The basal cisterns are patent. There is no abnormal extra-axial fluid collection.  Carotid and vertebral artery calcifications are noted.    The calvarium and skull base are intact. The visualized paranasal sinuses and the mastoid air cells are clear.                               CT Cervical Spine Without Contrast (Final result)  Result time 05/23/22 13:58:53    Final result by Marion Lerma MD (05/23/22 13:58:53)                  Impression:      No acute fracture identified.      Electronically signed by: Marion Lerma  Date:    05/23/2022  Time:    13:58             Narrative:    EXAMINATION:  CT CERVICAL SPINE WITHOUT CONTRAST    CLINICAL HISTORY:  Neck trauma (Age >= 65y);    TECHNIQUE:  Noncontrast CT images of the cervical spine. Axial, coronal, and sagittal reformatted images were obtained. Dose length product is 1705 mGycm. Automatic exposure control, adjustment of mA/kV or iterative reconstruction technique was used to limit radiation dose.    COMPARISON:  None    FINDINGS:  The cervical spine is visualized through the level of T2.    There is no acute fracture identified.  There is a rightward curvature of the cervical spine.  There are multilevel degenerative changes with disc height loss, marginal osteophyte formation and facet arthropathy.  There is no paraspinal hematoma.  There is a large lipoma in the right posterolateral neck.                               X-Ray Chest AP Portable (Final result)  Result time 05/23/22 12:52:01    Final result by Tripp Escobedo MD (05/23/22 12:52:01)                 Impression:      No adverse interval change.  No definite acute abnormality.  Right pleural effusion and chronic parenchymal thickening may be related to scarring.  Correlate with patient's presentation.      Electronically signed by: Tripp Escobedo  Date:    05/23/2022  Time:    12:52             Narrative:    EXAMINATION:  XR CHEST AP PORTABLE    CLINICAL HISTORY:  trauma;    TECHNIQUE:  Single view of the chest    COMPARISON:  11/17/2021    FINDINGS:  Remote posttraumatic changes of the right hemithorax with right pleural effusion and increased interstitial markings in the right lower zone 7 appears similar to prior.  Findings may be related to chronic process or remote trauma.  The left hemithorax is clear.  Vascular atherosclerotic disease is again noted.  The cardiomediastinal silhouette is unchanged.                                X-Ray Pelvis Routine AP (Final result)  Result time 05/23/22 12:53:07    Final result by Tripp Escobedo MD (05/23/22 12:53:07)                 Impression:      Limited evaluation secondary to poor positioning and penetration. Left total hip is noted. No definite displaced fracture. The vascular atherosclerotic disease is noted.  If continued pain or significant trauma recommend repeat imaging or cross-sectional imaging such as CT.      Electronically signed by: Tripp Escobedo  Date:    05/23/2022  Time:    12:53             Narrative:    EXAMINATION:  XR PELVIS ROUTINE AP    CLINICAL HISTORY:  Injury, unspecified, initial encounter    TECHNIQUE:  Single view of the pelvis.    COMPARISON:  No prior imaging available for comparison    FINDINGS:  Limited evaluation secondary to poor positioning and penetration.  Left total hip is noted.  No definite displaced fracture.  The vascular atherosclerotic disease is noted.  If continued pain or significant trauma recommend repeat imaging or cross-sectional imaging such as CT.                               X-Ray Hand 3 view Left (Final result)  Result time 05/23/22 12:50:51    Final result by Tripp Escobedo MD (05/23/22 12:50:51)                 Impression:      No acute osseous abnormality, fracture, or dislocation.    There is no significant degenerative change.    Vascular atherosclerotic disease present.      Electronically signed by: Tripp Escobedo  Date:    05/23/2022  Time:    12:50             Narrative:    EXAMINATION:  XR HAND COMPLETE 3 VIEW LEFT    CLINICAL HISTORY:  trauma;    TECHNIQUE:  Radiographs of the  with AP, lateral and oblique  views.    COMPARISON:  03/17/2018    FINDINGS:  There is no acute fracture, subluxation or dislocation.    Joints and interspaces appear maintained.    Osseous structures show normal bone mineral density.    Soft tissues are unremarkable.    Vascular atherosclerotic disease is present.    There are no radiopaque  foreign bodies.                                 Medications   HYDROcodone-acetaminophen 5-325 mg per tablet 1 tablet (1 tablet Oral Given 5/23/22 1516)     Medical Decision Making:   History:   I obtained history from: someone other than patient and EMS provider.  Old Medical Records: I decided to obtain old medical records.  Old Records Summarized: other records.  Independently Interpreted Test(s):   I have ordered and independently interpreted X-rays - see prior notes.  Clinical Tests:   Lab Tests: Ordered and Reviewed  The following lab test(s) were unremarkable: CBC and CMP  Radiological Study: Ordered and Reviewed  Other:   I have discussed this case with another health care provider.  Fall, nonambulatory, chronic respiratory failure, at baseline, pain controlled, dc back to [facility]          Scribe Attestation:   Scribe #1: I performed the above scribed service and the documentation accurately describes the services I performed. I attest to the accuracy of the note.    Attending Attestation:           Physician Attestation for Scribe:  Physician Attestation Statement for Scribe #1: I, Bren Arreaga MD, reviewed documentation, as scribed by Osiris Stevenson in my presence, and it is both accurate and complete.             ED Course as of 05/23/22 1526   Mon May 23, 2022   1317 Per nh patient has been complaining about his right hip since fall a couple weeks ago [BS]   1430 He has recently had xr x2 of his hip [BS]   1434 His sat is 100% on his home 3L: O2, he is in no distress, believe above readings were poor wave form [BS]   1445 Spoek with Dr. Barnard, with previous THR and hardware in tact, nonambulatory nothing to do operatively. If he was ambulatory would still be non-op [BS]   1524 Pt updated, agreeable with plan [BS]      ED Course User Index  [BS] Bren Arreaga MD             Clinical Impression:   Final diagnoses:  [T14.90XA] Trauma  [M25.559] Hip pain (Primary)  [R51.9] Left facial pain  [M79.642] Left  hand pain  [N18.6, Z99.2] ESRD (end stage renal disease) on dialysis  [I10] Benign essential HTN  [J96.11] Chronic respiratory failure with hypoxia  [S72.92XA] Femur fracture, left  [S72.112A] Closed avulsion fracture of greater trochanter of left femur, initial encounter          ED Disposition Condition    Discharge Stable        ED Prescriptions     None        Follow-up Information     Follow up With Specialties Details Why Contact Info    Kali Barnard MD Orthopedic Surgery Schedule an appointment as soon as possible for a visit   [facility] [facility] [address] [phone]      Dr Morales  Call       [facility] - Emergency Dept Emergency Medicine  As needed, If symptoms worsen [facility] [facility] [id number] [phone]           Bren Arreaga MD  05/23/22 1528

## 2022-05-23 NOTE — ED NOTES
Pt presents to trauma 1 on ems stretcher. Pt awake and alert. Pt reports from dialysis after a full run with reports of fall this am at nursing home. +hit head, -loc. On plavix. No obvious signs of injury. Pt c/o L cheek, L hand, and L buttock pain.

## 2022-05-27 ENCOUNTER — LAB REQUISITION (OUTPATIENT)
Dept: LAB | Facility: HOSPITAL | Age: 82
End: 2022-05-27
Payer: MEDICARE

## 2022-05-27 DIAGNOSIS — E11.9 TYPE 2 DIABETES MELLITUS WITHOUT COMPLICATIONS: ICD-10-CM

## 2022-05-27 LAB — GLUCOSE SERPL-MCNC: 91 MG/DL (ref 82–115)

## 2022-05-27 PROCEDURE — 82947 ASSAY GLUCOSE BLOOD QUANT: CPT | Performed by: FAMILY MEDICINE

## 2022-06-28 ENCOUNTER — HOSPITAL ENCOUNTER (EMERGENCY)
Facility: HOSPITAL | Age: 82
Discharge: HOME | End: 2022-06-28
Attending: EMERGENCY MEDICINE

## 2022-06-28 VITALS
TEMPERATURE: 98 F | SYSTOLIC BLOOD PRESSURE: 133 MMHG | OXYGEN SATURATION: 95 % | HEART RATE: 67 BPM | DIASTOLIC BLOOD PRESSURE: 65 MMHG | RESPIRATION RATE: 18 BRPM | WEIGHT: 125 LBS

## 2022-06-28 DIAGNOSIS — S09.90XA CLOSED HEAD INJURY, INITIAL ENCOUNTER: Primary | ICD-10-CM

## 2022-06-28 DIAGNOSIS — N18.6 ESRD ON HEMODIALYSIS: ICD-10-CM

## 2022-06-28 DIAGNOSIS — S60.011A CONTUSION OF RIGHT THUMB WITHOUT DAMAGE TO NAIL, INITIAL ENCOUNTER: ICD-10-CM

## 2022-06-28 DIAGNOSIS — T14.90XA TRAUMA: ICD-10-CM

## 2022-06-28 DIAGNOSIS — Z99.2 ESRD ON HEMODIALYSIS: ICD-10-CM

## 2022-06-28 DIAGNOSIS — Z99.81 OXYGEN DEPENDENT: ICD-10-CM

## 2022-06-28 DIAGNOSIS — S00.83XA CONTUSION OF FOREHEAD, INITIAL ENCOUNTER: ICD-10-CM

## 2022-06-28 DIAGNOSIS — J96.11 CHRONIC RESPIRATORY FAILURE WITH HYPOXIA: ICD-10-CM

## 2022-06-28 LAB
ALBUMIN SERPL-MCNC: 3.9 GM/DL (ref 3.4–4.8)
ALBUMIN/GLOB SERPL: 1.4 RATIO (ref 1.1–2)
ALP SERPL-CCNC: 92 UNIT/L (ref 40–150)
ALT SERPL-CCNC: 28 UNIT/L (ref 0–55)
AST SERPL-CCNC: 29 UNIT/L (ref 5–34)
BASE EXCESS ARTERIAL: NORMAL
BASOPHILS # BLD AUTO: 0.02 X10(3)/MCL (ref 0–0.2)
BASOPHILS NFR BLD AUTO: 0.2 %
BILIRUBIN DIRECT+TOT PNL SERPL-MCNC: 0.9 MG/DL
BUN SERPL-MCNC: 34.7 MG/DL (ref 8.4–25.7)
CALCIUM SERPL-MCNC: 9.5 MG/DL (ref 8.8–10)
CHLORIDE SERPL-SCNC: 97 MMOL/L (ref 98–107)
CO2 SERPL-SCNC: 26 MMOL/L (ref 23–31)
CORRECTED TEMPERATURE (PCO2): 37 MMHG (ref 35–45)
CORRECTED TEMPERATURE (PH): 7.45 (ref 7.35–7.45)
CORRECTED TEMPERATURE (PO2): 60 MMHG (ref 80–100)
CREAT SERPL-MCNC: 5.09 MG/DL (ref 0.73–1.18)
EOSINOPHIL # BLD AUTO: 0.03 X10(3)/MCL (ref 0–0.9)
EOSINOPHIL NFR BLD AUTO: 0.4 %
ERYTHROCYTE [DISTWIDTH] IN BLOOD BY AUTOMATED COUNT: 21.1 % (ref 11.5–17)
FLUAV AG UPPER RESP QL IA.RAPID: NOT DETECTED
FLUBV AG UPPER RESP QL IA.RAPID: NOT DETECTED
GLOBULIN SER-MCNC: 2.8 GM/DL (ref 2.4–3.5)
GLUCOSE SERPL-MCNC: 222 MG/DL (ref 82–115)
HCO3 ARTERIAL: NORMAL
HCO3 UR-SCNC: 25.7 MMOL/L
HCT VFR BLD AUTO: 40.3 %
HGB BLD-MCNC: 11.9 G/DL (ref 12–16)
HGB BLD-MCNC: 12.7 GM/DL
HGB BLD-MCNC: NORMAL G/DL
IMM GRANULOCYTES # BLD AUTO: 0.15 X10(3)/MCL (ref 0–0.02)
IMM GRANULOCYTES NFR BLD AUTO: 1.8 % (ref 0–0.43)
LYMPHOCYTES # BLD AUTO: 0.59 X10(3)/MCL (ref 0.6–4.6)
LYMPHOCYTES NFR BLD AUTO: 7.2 %
MAGNESIUM SERPL-MCNC: 2.1 MG/DL (ref 1.6–2.6)
MCH RBC QN AUTO: 31.2 PG (ref 27–31)
MCHC RBC AUTO-ENTMCNC: 31.5 MG/DL (ref 33–36)
MCV RBC AUTO: 99 FL (ref 80–94)
MONOCYTES # BLD AUTO: 0.79 X10(3)/MCL (ref 0.1–1.3)
MONOCYTES NFR BLD AUTO: 9.6 %
NEUTROPHILS # BLD AUTO: 6.6 X10(3)/MCL (ref 2.1–9.2)
NEUTROPHILS NFR BLD AUTO: 80.8 %
NRBC BLD AUTO-RTO: 0.2 %
PCO2 BLDA: 37 MMHG (ref 35–45)
PCO2 BLDA: NORMAL MM[HG]
PCO2 BLDA: NORMAL MM[HG]
PH SMN: 7.45 [PH] (ref 7.35–7.45)
PH SMN: NORMAL [PH]
PLATELET # BLD AUTO: 133 X10(3)/MCL (ref 130–400)
PMV BLD AUTO: 10.6 FL (ref 9.4–12.4)
PO2 BLDA: 60 MMHG (ref 80–100)
PO2 BLDA: NORMAL MM[HG]
POC BASE DEFICIT: 1.8 MMOL/L (ref -2–2)
POC COHB: 1 %
POC COHB: NORMAL
POC IONIZED CALCIUM: 1.12 MMOL/L (ref 1.12–1.23)
POC IONIZED CALCIUM: NORMAL
POC METHB: 0.4 % (ref 0.4–2)
POC METHB: NORMAL
POC O2HB: 88.5 % (ref 94–97)
POC O2HB: NORMAL
POC SATURATED O2: 91.8 %
POC TEMPERATURE: 37 °C
POTASSIUM BLD-SCNC: 3.9 MMOL/L (ref 3.5–5)
POTASSIUM BLD-SCNC: NORMAL MMOL/L
POTASSIUM SERPL-SCNC: 4.6 MMOL/L (ref 3.5–5.1)
PROT SERPL-MCNC: 6.7 GM/DL (ref 5.8–7.6)
RBC # BLD AUTO: 4.07 X10(6)/MCL
SARS-COV-2 RNA RESP QL NAA+PROBE: NOT DETECTED
SATURATED O2 ARTERIAL, I-STAT: NORMAL
SODIUM BLD-SCNC: 133 MMOL/L (ref 137–145)
SODIUM BLD-SCNC: NORMAL MMOL/L
SODIUM SERPL-SCNC: 139 MMOL/L (ref 136–145)
SPECIMEN SOURCE: ABNORMAL
WBC # SPEC AUTO: 8.2 X10(3)/MCL (ref 4.5–11.5)

## 2022-06-28 PROCEDURE — 80053 COMPREHEN METABOLIC PANEL: CPT | Performed by: EMERGENCY MEDICINE

## 2022-06-28 PROCEDURE — 36415 COLL VENOUS BLD VENIPUNCTURE: CPT | Performed by: EMERGENCY MEDICINE

## 2022-06-28 PROCEDURE — 83735 ASSAY OF MAGNESIUM: CPT | Performed by: EMERGENCY MEDICINE

## 2022-06-28 PROCEDURE — 85025 COMPLETE CBC W/AUTO DIFF WBC: CPT | Performed by: EMERGENCY MEDICINE

## 2022-06-28 PROCEDURE — G0390 TRAUMA RESPONS W/HOSP CRITI: HCPCS

## 2022-06-28 PROCEDURE — 99900035 HC TECH TIME PER 15 MIN (STAT)

## 2022-06-28 PROCEDURE — 36600 WITHDRAWAL OF ARTERIAL BLOOD: CPT

## 2022-06-28 PROCEDURE — 87636 SARSCOV2 & INF A&B AMP PRB: CPT | Performed by: EMERGENCY MEDICINE

## 2022-06-28 PROCEDURE — 82803 BLOOD GASES ANY COMBINATION: CPT

## 2022-06-28 PROCEDURE — 99285 EMERGENCY DEPT VISIT HI MDM: CPT | Mod: 25

## 2022-06-28 NOTE — ED PROVIDER NOTES
Encounter Date: 6/28/2022    SCRIBE #1 NOTE: I, Mohamud Mckeon, am scribing for, and in the presence of,  Shawnee Haider MD. I have scribed the following portions of the note - Other sections scribed: HPI, ROS, physical exam.       History   No chief complaint on file.    80 y/o male with a history of CVA and ESRD on MWF hemodialysis presenting to the ED via EMS from NH after pt fell from his bed and hit his head. EMS reports pt is GCS 14 and on 3L O2 at baseline. Pt denies any back or neck pain. He states he dialyzed yesterday. Pt is on Eliquis.    The history is provided by the patient. No  was used.   Fall  The fall occurred from a bed. The point of impact was the head. Sandra Khanna was not ambulatory at the scene. Pertinent negatives include no neck pain, no back pain, no fever, no abdominal pain, no nausea, no vomiting, no hematuria and no headaches. Sandra Khanna has tried nothing for the symptoms. The treatment provided no relief.     Review of patient's allergies indicates:  Not on File  Past Medical History:   Diagnosis Date    Anticoagulated by anticoagulation treatment     CVA (cerebral vascular accident)     ESRD on hemodialysis      History reviewed. No pertinent surgical history.  History reviewed. No pertinent family history.     Review of Systems   Constitutional: Negative for fatigue and fever.   Eyes: Negative for visual disturbance.   Respiratory: Negative for chest tightness and shortness of breath.    Cardiovascular: Negative for chest pain.   Gastrointestinal: Negative for abdominal pain, diarrhea, nausea and vomiting.   Genitourinary: Negative for dysuria and hematuria.   Musculoskeletal: Negative for back pain and neck pain.   Skin: Negative for rash.   Allergic/Immunologic: Negative for immunocompromised state.   Neurological: Negative for headaches.   Hematological:        Anticoagulated   All other systems reviewed and are negative.      Physical  Exam     Initial Vitals   BP Pulse Resp Temp SpO2   06/28/22 1613 06/28/22 1611 06/28/22 1611 06/28/22 1611 06/28/22 1625   123/74 70 14 98.2 °F (36.8 °C) (!) 84 %      MAP       --                Physical Exam    Nursing note and vitals reviewed.  Constitutional: Sandra Khanna appears well-developed and well-nourished. Airway: Normal. Breathing: Normal. Circulation: Normal. No distress.   HENT:   Head: Normocephalic.   Mouth/Throat: Oropharynx is clear and moist.   R periorbital and forehead contusions. Dried blood to bilateral nares.   Eyes: Conjunctivae are normal. Pupils are equal, round, and reactive to light.   Neck: Neck supple.   Normal range of motion.  Cardiovascular: Normal rate, regular rhythm and normal heart sounds.   Pulmonary/Chest: Breath sounds normal.   Abdominal: Abdomen is soft.   Musculoskeletal:         General: Normal range of motion.      Cervical back: Normal range of motion and neck supple. No deformity or bony tenderness.      Thoracic back: No deformity or bony tenderness.      Lumbar back: No deformity or bony tenderness.     Neurological: Sandra Khanna is alert and oriented to person, place, and time. GCS score is 15. GCS eye subscore is 4. GCS verbal subscore is 5. GCS motor subscore is 6.   Skin: Skin is warm and dry. No rash noted.   Dialysis fistula to RUST, dressing in place   Psychiatric: Sandra Khanna has a normal mood and affect.         ED Course   Procedures  Labs Reviewed   COMPREHENSIVE METABOLIC PANEL - Abnormal; Notable for the following components:       Result Value    Chloride 97 (*)     Glucose Level 222 (*)     Blood Urea Nitrogen 34.7 (*)     Creatinine 5.09 (*)     All other components within normal limits   CBC WITH DIFFERENTIAL - Abnormal; Notable for the following components:    MCV 99.0 (*)     MCH 31.2 (*)     MCHC 31.5 (*)     RDW 21.1 (*)     Lymph # 0.59 (*)     IG# 0.15 (*)     IG% 1.8 (*)     All other components within normal  limits   COVID/FLU A&B PCR - Normal   MAGNESIUM - Normal   CBC W/ AUTO DIFFERENTIAL    Narrative:     The following orders were created for panel order CBC auto differential.  Procedure                               Abnormality         Status                     ---------                               -----------         ------                     CBC with Differential[[id number]]        Abnormal            Final result                 Please view results for these tests on the individual orders.          Imaging Results          X-Ray Hand 3 view Right (Final result)  Result time 06/28/22 17:19:31    Final result by Eamon Judd MD (06/28/22 17:19:31)                 Impression:      Nonstandard radiographic views.  In spite of this limitation:    No acute osseous abnormality of the right hand.      Electronically signed by: Eamon Judd  Date:    06/28/2022  Time:    17:19             Narrative:    EXAMINATION:  XR HAND COMPLETE 3 VIEW RIGHT    CLINICAL HISTORY:  fall, thumb pain, index finger pain;    TECHNIQUE:  Three nonstandard radiographic views of the right hand.    COMPARISON:  None available.    FINDINGS:  No acute displaced fracture or malalignment is identified of the right hand on the provided views.  Mild proximal and distal interphalangeal joint degenerative changes.  The joint spaces of the right hand are otherwise maintained on the provided views.  There is no erosion, aggressive-appearing bone lesion, or abnormal periosteal reaction. No soft tissue gas.  Scattered vascular calcifications.  Bone mineralization is diffusely diminished.                               CT Head Without Contrast (Final result)  Result time 06/28/22 16:46:05    Final result by Marion Lerma MD (06/28/22 16:46:05)                 Impression:      1. No acute intracranial abnormality.  2. Chronic microvascular ischemic changes.      Electronically signed by: Marion Lerma  Date:    06/28/2022  Time:    16:46              Narrative:    EXAMINATION:  CT HEAD WITHOUT CONTRAST    CLINICAL HISTORY:  Head trauma, minor (Age >= 65y);    TECHNIQUE:  Axial scans were obtained from skull base to the vertex.    Coronal and sagittal reconstructions obtained from the axial data.    Automatic exposure control was utilized to limit radiation dose.    Contrast: None    Radiation Dose:    Total DLP: 921 mGy*cm    COMPARISON:  None    FINDINGS:  There is no acute intracranial hemorrhage or edema.  There is a small focus of encephalomalacia in the left cerebellum.  Patchy hypodensities in the subcortical and periventricular white matter likely represent chronic microvascular ischemic changes.    There is no mass effect or midline shift.  There is diffuse parenchymal volume loss.  The basal cisterns are patent. There is no abnormal extra-axial fluid collection.  Carotid vertebral artery calcifications are noted    The calvarium and skull base are intact.  Bilateral nasal bone fractures are noted.  The visualized paranasal sinuses and the mastoid air cells are clear.  There is a contusion along the frontal scalp.                               X-Ray Pelvis Routine AP (Final result)  Result time 06/28/22 16:36:47    Final result by Patricia Jacobsen MD (06/28/22 16:36:47)                 Impression:      No acute osseous abnormality appreciable by portable radiographic evaluation.      Electronically signed by: Patricia Jacobsen  Date:    06/28/2022  Time:    16:36             Narrative:    EXAMINATION:  XR PELVIS ROUTINE AP    CLINICAL HISTORY:  fall;    TECHNIQUE:  AP view of the pelvis was performed.    COMPARISON:  None.    FINDINGS:  There are postsurgical changes of left hip arthroplasty.  Femoral stem is incompletely imaged.  Where visible, hardware components appear well seated.  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  No appreciable fracture.  Evaluation is limited due to bone mineralization and technique.    Extensive arterial  calcifications.  Stent in the left inguinal region.    Regional soft tissues are unremarkable.                               X-Ray Chest 1 View (Final result)  Result time 06/28/22 16:35:12    Final result by Patricia Jacobsen MD (06/28/22 16:35:12)                 Impression:      Small right pleural effusion with right basilar atelectasis versus contusion      Electronically signed by: Patricia Jacobsen  Date:    06/28/2022  Time:    16:35             Narrative:    EXAMINATION:  XR CHEST 1 VIEW    CLINICAL HISTORY:  Injury, unspecified, initial encounter    TECHNIQUE:  Single frontal view of the chest was performed.    COMPARISON:  None    FINDINGS:  LINES AND TUBES: EKG/telemetry leads overlie the chest.    MEDIASTINUM AND VERN: Cardiac silhouette is enlarged. Atherosclerosis of the aorta and its branch vessels.    LUNGS: Patchy right basilar opacity.  Patient is rotated to the right.    PLEURA:Small right pleural effusion.No pneumothorax.    BONES: Patient is rotated.  Limited evaluation of osseous structures.                                 Medications - No data to display  Medical Decision Making:   Initial Assessment:   Mr. Interiano presented for evaluation after ground level fall w/ head/facial impact, on anticoagulation. Will obtain CT head/face to ensure no ICH or facial fracture. Also c/o hand pain - will obtain XR to r/o fracture.   Differential Diagnosis:   Fall, closed head injury, ICH, facial fracture, hand sprain, hand fracture  Clinical Tests:   Lab Tests: Ordered and Reviewed  The following lab test(s) were unremarkable: CBC       <> Summary of Lab: ESRD    Radiological Study: Ordered and Reviewed  ED Management:  Imaging studies w/ no significant acute traumatic injuries sustained. Has been resting comfortably. Small pleural effusion noted. SPo2 at his baselin on supplemental O2. No complaint of shortness of breath, cough, etc. Will DC home. Should follow up with primary care.  ED return precautions  reviewed at the bedside and provided in the written discharge instructions. All questions answered to the best of my ability.             Scribe Attestation:   Scribe #1: I performed the above scribed service and the documentation accurately describes the services I performed. I attest to the accuracy of the note.    Attending Attestation:           Physician Attestation for Scribe:  Physician Attestation Statement for Scribe #1: I, Shawnee Haider MD, reviewed documentation, as scribed by Mohamud Mckeon in my presence, and it is both accurate and complete.                      Clinical Impression:   Final diagnoses:  [T14.90XA] Trauma  [S09.90XA] Closed head injury, initial encounter (Primary)  [S00.83XA] Contusion of forehead, initial encounter  [N18.6, Z99.2] ESRD on hemodialysis  [Z99.81] Oxygen dependent  [J96.11] Chronic respiratory failure with hypoxia  [S60.011A] Contusion of right thumb without damage to nail, initial encounter          ED Disposition Condition    Discharge Stable        ED Prescriptions     None        Follow-up Information     Follow up With Specialties Details Why Contact Info    Delmar Lainez III, MD Family Medicine Schedule an appointment as soon as possible for a visit in 3 days  [address] [address]  [phone]      [facility] - Emergency Dept Emergency Medicine  As needed, If symptoms worsen [address] [facility] [address]  [phone]           Shawnee Haider MD  07/21/22 0801

## 2022-07-06 ENCOUNTER — LAB REQUISITION (OUTPATIENT)
Dept: LAB | Facility: HOSPITAL | Age: 82
End: 2022-07-06
Payer: MEDICARE

## 2022-07-06 DIAGNOSIS — N18.9 CHRONIC KIDNEY DISEASE, UNSPECIFIED: ICD-10-CM

## 2022-07-06 DIAGNOSIS — Z29.9 ENCOUNTER FOR PROPHYLACTIC MEASURES, UNSPECIFIED: ICD-10-CM

## 2022-07-06 LAB
ALBUMIN SERPL-MCNC: 3 GM/DL (ref 3.4–4.8)
ALBUMIN/GLOB SERPL: 1.1 RATIO (ref 1.1–2)
ALP SERPL-CCNC: 86 UNIT/L (ref 40–150)
ALT SERPL-CCNC: 32 UNIT/L (ref 0–55)
AST SERPL-CCNC: 38 UNIT/L (ref 5–34)
BASOPHILS # BLD AUTO: 0.02 X10(3)/MCL (ref 0–0.2)
BASOPHILS NFR BLD AUTO: 0.3 %
BILIRUBIN DIRECT+TOT PNL SERPL-MCNC: 1 MG/DL
BUN SERPL-MCNC: 18 MG/DL (ref 8.4–25.7)
CALCIUM SERPL-MCNC: 8.6 MG/DL (ref 8.8–10)
CHLORIDE SERPL-SCNC: 99 MMOL/L (ref 98–107)
CO2 SERPL-SCNC: 29 MMOL/L (ref 23–31)
CREAT SERPL-MCNC: 3.05 MG/DL (ref 0.73–1.18)
EOSINOPHIL # BLD AUTO: 0.03 X10(3)/MCL (ref 0–0.9)
EOSINOPHIL NFR BLD AUTO: 0.5 %
ERYTHROCYTE [DISTWIDTH] IN BLOOD BY AUTOMATED COUNT: 20.9 % (ref 11.5–17)
GLOBULIN SER-MCNC: 2.7 GM/DL (ref 2.4–3.5)
GLUCOSE SERPL-MCNC: 262 MG/DL (ref 82–115)
HCT VFR BLD AUTO: 37.8 % (ref 42–52)
HGB BLD-MCNC: 12.1 GM/DL (ref 14–18)
IMM GRANULOCYTES # BLD AUTO: 0.09 X10(3)/MCL (ref 0–0.04)
IMM GRANULOCYTES NFR BLD AUTO: 1.5 %
LYMPHOCYTES # BLD AUTO: 0.68 X10(3)/MCL (ref 0.6–4.6)
LYMPHOCYTES NFR BLD AUTO: 11.3 %
MCH RBC QN AUTO: 31.8 PG (ref 27–31)
MCHC RBC AUTO-ENTMCNC: 32 MG/DL (ref 33–36)
MCV RBC AUTO: 99.2 FL (ref 80–94)
MONOCYTES # BLD AUTO: 0.74 X10(3)/MCL (ref 0.1–1.3)
MONOCYTES NFR BLD AUTO: 12.3 %
NEUTROPHILS # BLD AUTO: 4.5 X10(3)/MCL (ref 2.1–9.2)
NEUTROPHILS NFR BLD AUTO: 74.1 %
NRBC BLD AUTO-RTO: 0.3 %
PLATELET # BLD AUTO: 61 X10(3)/MCL (ref 130–400)
PLATELETS.RETICULATED NFR BLD AUTO: 7.2 % (ref 0.9–11.2)
PMV BLD AUTO: ABNORMAL FL
POTASSIUM SERPL-SCNC: 2.9 MMOL/L (ref 3.5–5.1)
PROT SERPL-MCNC: 5.7 GM/DL (ref 5.8–7.6)
RBC # BLD AUTO: 3.81 X10(6)/MCL (ref 4.7–6.1)
SODIUM SERPL-SCNC: 142 MMOL/L (ref 136–145)
WBC # SPEC AUTO: 6 X10(3)/MCL (ref 4.5–11.5)

## 2022-07-06 PROCEDURE — 80053 COMPREHEN METABOLIC PANEL: CPT | Performed by: FAMILY MEDICINE

## 2022-07-06 PROCEDURE — 85025 COMPLETE CBC W/AUTO DIFF WBC: CPT | Performed by: FAMILY MEDICINE
